# Patient Record
Sex: MALE | Race: WHITE | URBAN - METROPOLITAN AREA
[De-identification: names, ages, dates, MRNs, and addresses within clinical notes are randomized per-mention and may not be internally consistent; named-entity substitution may affect disease eponyms.]

---

## 2018-03-16 ENCOUNTER — THERAPY VISIT (OUTPATIENT)
Dept: PHYSICAL THERAPY | Facility: CLINIC | Age: 58
End: 2018-03-16
Payer: COMMERCIAL

## 2018-03-16 DIAGNOSIS — M72.2 PLANTAR FASCIITIS, RIGHT: Primary | ICD-10-CM

## 2018-03-16 PROCEDURE — 97110 THERAPEUTIC EXERCISES: CPT | Mod: GP | Performed by: PHYSICAL THERAPIST

## 2018-03-16 PROCEDURE — 97530 THERAPEUTIC ACTIVITIES: CPT | Mod: GP | Performed by: PHYSICAL THERAPIST

## 2018-03-16 PROCEDURE — 97161 PT EVAL LOW COMPLEX 20 MIN: CPT | Mod: GP | Performed by: PHYSICAL THERAPIST

## 2018-03-16 NOTE — PROGRESS NOTES
[facility] Initial Evaluation        Subjective:  Patient is a 57 year old male presenting with rehab right ankle/foot hpi.   Ihsan BUNN Jeane is a 57 year old male with a right foot condition.  Condition occurred with:  Repetition/overuse (Pt. had an onset of R planatr foot pain after running 1-20. Pt. has had increased plantar foot pain with amb and 1st getting up in AM since. He normally runs 3-4 x week but has held off since onset. No hx of R foot injury.).  Condition occurred: during recreation/sport.  This is a new condition  1-20-18.    Site of Pain: R plantar foot.  Radiates to:  No radiation.  Pain is described as sharp and is intermittent and reported as 4/10.  Associated symptoms:  Loss of motion/stiffness. Pain is worse in the A.M..  Symptoms are exacerbated by standing and walking and relieved by rest and ice.  Since onset symptoms are gradually improving.  Special tests:  X-ray.  Previous treatment: none.    General health as reported by patient is good.                                              Objective:  Standing Alignment:                Ankle/Foot:  Normal    Gait:    Gait Type:  Normal         Flexibility/Screens:       Lower Extremity:      Decreased right lower extremity flexibility:  Gastroc and Soleus          Ankle/Foot Evaluation  ROM:    AROM:    Dorsiflexion:  Left:   14  Right:   13  Plantarflexion:  Left:  70    Right:  70  Inversion:  Left:  44     Right:  42  Eversion:  26     Right:  28        Strength:    Dorsiflexion:  Right: 5/5   Pain:  Plantarflexion: Right: 4+/5  Pain:  Inversion:Right: 5/5  Pain:  Eversion:Right: 5/5  Pain:                  LIGAMENT TESTING: normal                PALPATION:     Right ankle tenderness present at:   plantar fascia  EDEMA: normal          MOBILITY TESTING: normal                                                                General     ROS    Assessment/Plan:    Patient is a 57 year old male with right side ankle complaints.     Patient has the following significant findings with corresponding treatment plan.                Diagnosis 1:  R plantar fasciitis  Pain -  US, manual therapy, self management and education  Decreased ROM/flexibility - manual therapy and therapeutic exercise  Decreased proprioception - neuro re-education and therapeutic activities  Impaired muscle performance - neuro re-education  Decreased function - therapeutic activities    Therapy Evaluation Codes:   1) History comprised of:   Personal factors that impact the plan of care:      None.    Comorbidity factors that impact the plan of care are:      None.     Medications impacting care: None.  2) Examination of Body Systems comprised of:   Body structures and functions that impact the plan of care:      Ankle.   Activity limitations that impact the plan of care are:      Running and Walking.  3) Clinical presentation characteristics are:   Stable/Uncomplicated.  4) Decision-Making    Low complexity using standardized patient assessment instrument and/or measureable assessment of functional outcome.  Cumulative Therapy Evaluation is: Low complexity.    Previous and current functional limitations:  (See Goal Flow Sheet for this information)    Short term and Long term goals: (See Goal Flow Sheet for this information)     Communication ability:  Patient appears to be able to clearly communicate and understand verbal and written communication and follow directions correctly.  Treatment Explanation - The following has been discussed with the patient:   RX ordered/plan of care  Anticipated outcomes  Possible risks and side effects  This patient would benefit from PT intervention to resume normal activities.   Rehab potential is good.    Frequency:  2 X a month, once daily  Duration:  for 2 months  Discharge Plan:  Achieve all LTG.  Independent in home treatment program.  Reach maximal therapeutic benefit.    Please refer to the daily flowsheet for treatment today, total  treatment time and time spent performing 1:1 timed codes.        Parent

## 2018-03-16 NOTE — MR AVS SNAPSHOT
"              After Visit Summary   3/16/2018    Ihsan Ching    MRN: [id number]           Patient Information     Date Of Birth          1960        Visit Information        Provider Department      3/16/2018 8:10 AM Lula Crowe PT [facility]        Today's Diagnoses     Plantar fasciitis, right    -  1       Follow-ups after your visit        Who to contact     If you have questions or need follow up information about today's clinic visit or your schedule please contact [facility] PHYSICAL [facility] directly at [phone].  Normal or non-critical lab and imaging results will be communicated to you by [employer], letter or phone within 4 business days after the clinic has received the results. If you do not hear from us within 7 days, please contact the clinic through [employer] or phone. If you have a critical or abnormal lab result, we will notify you by phone as soon as possible.  Submit refill requests through [employer] or call your pharmacy and they will forward the refill request to us. Please allow 3 business days for your refill to be completed.          Additional Information About Your Visit        MyChart Information     [employer] lets you send messages to your doctor, view your test results, renew your prescriptions, schedule appointments and more. To sign up, go to [email or web address] . Click on \"Log in\" on the left side of the screen, which will take you to the Welcome page. Then click on \"Sign up Now\" on the right side of the page.     You will be asked to enter the access code listed below, as well as some personal information. Please follow the directions to create your username and password.     Your access code is: [email or web address]  Expires: 2018  1:50 PM     Your access code will  in 90 days. If you need help or a new code, please call your [place] clinic or [phone].        Care EveryWhere ID "     This is your Care EveryWhere ID. This could be used by other organizations to access your [place] medical records  [id number]         Blood Pressure from Last 3 Encounters:   No data found for BP    Weight from Last 3 Encounters:   No data found for Wt              We Performed the Following     PEE Inital Eval Report     PT Eval, Low Complexity (10998)     Therapeutic Activities     Therapeutic Exercises        Primary Care Provider Office Phone # Fax #    [facility] [phone]       [facility] [address]        Equal Access to Services     [facility] : Hadii aad ku hadasho Soomaali, waaxda luqadaha, qaybta kaalmada adeegyada, waxay idiin hayemeritan remberto bowden . So [facility] [phone].    ATENCIÓN: Si habla español, tiene a martinez disposición servicios gratuitos de asistencia lingüística. Llame al [phone].    We comply with applicable federal civil rights laws and [place] laws. We do not discriminate on the basis of race, color, national origin, age, disability, sex, sexual orientation, or gender identity.            Thank you!     Thank you for choosing [facility]  for your care. Our goal is always to provide you with excellent care. Hearing back from our patients is one way we can continue to improve our services. Please take a few minutes to complete the written survey that you may receive in the mail after your visit with us. Thank you!             Your Updated Medication List - Protect others around you: Learn how to safely use, store and throw away your medicines at www.disposemymeds.org.      Notice  As of 3/16/2018  1:50 PM    You have not been prescribed any medications.

## 2018-03-16 NOTE — LETTER
[facility] ATHLETIC [facility] PHYSICAL THERAPY   [address] [address]  [phone]    2018    Re: Ihsan Ching   :   1960  MRN:  [id number]   REFERRING PHYSICIAN:   [facility] ATHLETIC [facility] PHYSICAL [facility]    Date of Initial Evaluation:  2018  Visits:  Rxs Used: 1  Reason for Referral:  Plantar fasciitis, right    EVALUATION SUMMARY    [facility] Initial Evaluation    Subjective:  Patient is a 57 year old male presenting with rehab right ankle/foot hpi.   Ihsan Ching is a 57 year old male with a right foot condition.  Condition occurred with:  Repetition/overuse (Pt. had an onset of R planatr foot pain after running -20. Pt. has had increased plantar foot pain with amb and 1st getting up in AM since. He normally runs 3-4 x week but has held off since onset. No hx of R foot injury.).  Condition occurred: during recreation/sport.  This is a new condition  18.    Site of Pain: R plantar foot.  Radiates to:  No radiation.  Pain is described as sharp and is intermittent and reported as 4/10.  Associated symptoms:  Loss of motion/stiffness. Pain is worse in the A.M..  Symptoms are exacerbated by standing and walking and relieved by rest and ice.  Since onset symptoms are gradually improving.  Special tests:  X-ray.  Previous treatment: none.    General health as reported by patient is good.                Objective:  Standing Alignment:    Ankle/Foot:  Normal  Gait:    Gait Type:  Normal     Flexibility/Screens:   Lower Extremity:  Decreased right lower extremity flexibility:  Gastroc and Soleus  Ankle/Foot Evaluation  ROM:    AROM:    Dorsiflexion:  Left:   14  Right:   13  Plantarflexion:  Left:  70    Right:  70  Inversion:  Left:  44     Right:  42  Eversion:  26     Right:  28  Strength:    Dorsiflexion:  Right: 5/5   Pain:  Plantarflexion: Right: 4+/5   Pain:  Inversion:Right: 5/5  Pain:  Eversion:Right: 5/5  Pain:  Re: Ihsan Ching   :   1960    LIGAMENT TESTING: normal  PALPATION:   Right ankle tenderness present at:   plantar fascia  EDEMA: normal  MOBILITY TESTING: normal    Assessment/Plan:    Patient is a 57 year old male with right side ankle complaints.    Patient has the following significant findings with corresponding treatment plan.                Diagnosis 1:  R plantar fasciitis  Pain -  US, manual therapy, self management and education  Decreased ROM/flexibility - manual therapy and therapeutic exercise  Decreased proprioception - neuro re-education and therapeutic activities  Impaired muscle performance - neuro re-education  Decreased function - therapeutic activities  Therapy Evaluation Codes:   1) History comprised of:   Personal factors that impact the plan of care:      None.    Comorbidity factors that impact the plan of care are:      None.     Medications impacting care: None.  2) Examination of Body Systems comprised of:   Body structures and functions that impact the plan of care:      Ankle.   Activity limitations that impact the plan of care are:      Running and Walking.  3) Clinical presentation characteristics are:   Stable/Uncomplicated.  4) Decision-Making    Low complexity using standardized patient assessment instrument and/or measureable assessment of functional outcome.  Cumulative Therapy Evaluation is: Low complexity.  Previous and current functional limitations:  (See Goal Flow Sheet for this information)    Short term and Long term goals: (See Goal Flow Sheet for this information)   Communication ability:  Patient appears to be able to clearly communicate and understand verbal and written communication and follow directions correctly.  Treatment Explanation - The following has been discussed with the patient:   RX ordered/plan of care  Anticipated outcomes  Possible risks and side effects  This patient would benefit  from PT intervention to resume normal activities.   Rehab potential is good.    Frequency:  2 X a month, once daily  Duration:  for 2 months  Discharge Plan:  Achieve all LTG.  Independent in home treatment program.  Reach maximal therapeutic benefit.          Thank you for your referral.    INQUIRIES  Therapist: Lula Crowe, PT  [facility]  [address] [place] [address]  Phone: [phone]  Fax: [phone]